# Patient Record
Sex: MALE | Race: WHITE | NOT HISPANIC OR LATINO | Employment: UNEMPLOYED | URBAN - METROPOLITAN AREA
[De-identification: names, ages, dates, MRNs, and addresses within clinical notes are randomized per-mention and may not be internally consistent; named-entity substitution may affect disease eponyms.]

---

## 2018-02-12 PROBLEM — R05.9 COUGH: Status: ACTIVE | Noted: 2018-02-12

## 2018-02-12 PROBLEM — R63.4 WEIGHT LOSS, UNINTENTIONAL: Status: ACTIVE | Noted: 2018-02-12

## 2018-02-12 PROBLEM — H66.91 ACUTE EAR INFECTION, RIGHT: Status: ACTIVE | Noted: 2018-02-12

## 2018-02-12 PROBLEM — R35.0 FREQUENT URINATION: Status: ACTIVE | Noted: 2018-02-12

## 2018-02-12 PROBLEM — R53.83 OTHER FATIGUE: Status: ACTIVE | Noted: 2018-02-12

## 2018-02-12 PROBLEM — E16.2 HYPOGLYCEMIA: Status: ACTIVE | Noted: 2018-02-12

## 2018-02-12 PROBLEM — R10.31 RIGHT LOWER QUADRANT ABDOMINAL PAIN: Status: ACTIVE | Noted: 2018-02-12

## 2018-02-12 PROBLEM — R30.0 DYSURIA: Status: ACTIVE | Noted: 2018-02-12

## 2018-02-12 PROBLEM — J02.9 SORE THROAT: Status: ACTIVE | Noted: 2018-02-12

## 2018-02-12 PROBLEM — B00.9 HSV-1 INFECTION: Status: ACTIVE | Noted: 2018-02-12

## 2018-02-26 ENCOUNTER — OFFICE VISIT (OUTPATIENT)
Dept: ENDOCRINOLOGY | Facility: MEDICAL CENTER | Age: 33
End: 2018-02-26
Payer: MEDICAID

## 2018-02-26 VITALS
OXYGEN SATURATION: 98 % | HEIGHT: 72 IN | HEART RATE: 54 BPM | SYSTOLIC BLOOD PRESSURE: 136 MMHG | BODY MASS INDEX: 24.11 KG/M2 | WEIGHT: 178 LBS | DIASTOLIC BLOOD PRESSURE: 78 MMHG

## 2018-02-26 DIAGNOSIS — R73.09 ELEVATED RANDOM BLOOD GLUCOSE LEVEL: ICD-10-CM

## 2018-02-26 PROBLEM — R73.9 ELEVATED RANDOM BLOOD GLUCOSE LEVEL: Status: ACTIVE | Noted: 2018-02-26

## 2018-02-26 LAB
HBA1C MFR BLD: 5.3 % (ref ?–5.8)
INT CON NEG: NORMAL
INT CON POS: NORMAL

## 2018-02-26 PROCEDURE — 99213 OFFICE O/P EST LOW 20 MIN: CPT | Performed by: PHYSICIAN ASSISTANT

## 2018-02-26 PROCEDURE — 83036 HEMOGLOBIN GLYCOSYLATED A1C: CPT | Performed by: PHYSICIAN ASSISTANT

## 2018-02-27 PROBLEM — R73.9 BLOOD GLUCOSE ELEVATED: Status: ACTIVE | Noted: 2018-02-27

## 2018-02-27 PROBLEM — R10.9 ABDOMINAL PAIN: Status: ACTIVE | Noted: 2018-02-27

## 2018-02-27 NOTE — PROGRESS NOTES
New Patient Consult Note  Referred by: RENO [place]  Reason for consult:  Elevated BG    HPI:  Jay Will is a 32 y.o. old patient who is seeing us today for diabetes care.  This is a pleasant patient with diabetes and I appreciate the opportunity to participate in the care of this patient.  This is a new patient with me today.    Labs of 2/26/18 HbA1c is 5.3  Labs of 2/12/18  c-peptide 5.53, GFR>60,     BG Diary:2/26/2018  In the AM: does not check    1. Elevated random blood glucose level  This is a new patient with me on 2/26/18  He is on:  No medication    He states he was worried about diabetes.        ROS:   Constitutional: No change in weight , No fatigue, No night sweats.  HEENT: No Headache.  Eyes:  No blurred vision, No visual changes.  Cardiac: No chest pain, No palpitations.  Resp: No shortness of breath, No cough,   Gastro: No nausea or vomiting, No diarrhea.  Neuro: Denies numbness or tinging in bilateral feet or hands, and no loss of sensation.  Endo: No heat or cold intolerance.  : No polyuria, No polydipsia, No chronic UTI's.  Lower extremities: No lower leg edema bilateral.  All other systems were reviewed and were negative.    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 No pertinent surgical history.    Allergies:  Patient has no known allergies.    Social History:  Social History     Social History   • Marital status: Single     Spouse name: N/A   • Number  "of children: N/A   • Years of education: N/A     Occupational History   • Not on file.     Social History Main Topics   • Smoking status: Former Smoker     Packs/day: 0.50     Types: Cigarettes   • Smokeless tobacco: Never Used   • Alcohol use No      Comment: Former   • Drug use: No   • Sexual activity: Not on file     Other Topics Concern   • Not on file     Social History Narrative   • No narrative on file       Family History:  Family History   Problem Relation Age of Onset   • Diabetes Brother        Medications:  No current outpatient prescriptions on file.      Physical Examination:   Vital signs: /78   Pulse (!) 54   Ht 1.829 m (6' 0.01\")   Wt 80.7 kg (178 lb)   SpO2 98%   BMI 24.14 kg/m²   General: No distress, cooperative, well dressed and well nourished.   Eyes: No scleral icterus or discharge, No hyposphagma  ENMT: Normal on external inspection of nose, lips, No nasal drainage   Neck: No abnormal masses on inspection  Resp: Normal effort, Bilateral clear to auscultation, No wheezing, No rales  CVS: Regular rate and rhythm, S1 S2 normal, No murmur. No gallop  Extremities: No edema bilateral extremities  Neuro: Alert and oriented  Skin: No rash, No Ulcers  Psych: Normal mood and affect      Assessment and Plan:    1. Elevated random blood glucose level  I discussed with him what diabetes is.  I discussed that he is not pre-diabetic with a HbA1c of 5.3.  I discussed with him food at length to have protein with all carbs and to exercise    Does not need to F/u    Blood glucose log: Check BG in the morning when wake up, before lunch or dinner and before bed.  So three times a day.  Always bring BG diary to the next office visit.      Thank you kindly for allowing me to participate in the diabetes care plan for this patient.    Michael Negrete PA-C, BC-ADM  Board Certified - [employer] [facility]  02/26/18    CC:   RENO [place]    "

## 2019-02-22 PROBLEM — R09.89 TENDER LYMPH NODE: Status: ACTIVE | Noted: 2019-02-22

## 2019-02-22 PROBLEM — E16.2 HYPOGLYCEMIA: Status: RESOLVED | Noted: 2018-02-12 | Resolved: 2019-02-22

## 2019-02-22 PROBLEM — R63.4 WEIGHT LOSS, UNINTENTIONAL: Status: RESOLVED | Noted: 2018-02-12 | Resolved: 2019-02-22

## 2019-02-22 PROBLEM — R35.0 FREQUENT URINATION: Status: RESOLVED | Noted: 2018-02-12 | Resolved: 2019-02-22

## 2019-02-22 PROBLEM — B00.9 HSV-1 INFECTION: Status: RESOLVED | Noted: 2018-02-12 | Resolved: 2019-02-22

## 2019-02-22 PROBLEM — R30.0 DYSURIA: Status: RESOLVED | Noted: 2018-02-12 | Resolved: 2019-02-22

## 2019-02-22 PROBLEM — R05.9 COUGH: Status: RESOLVED | Noted: 2018-02-12 | Resolved: 2019-02-22

## 2019-02-22 PROBLEM — H66.91 ACUTE EAR INFECTION, RIGHT: Status: RESOLVED | Noted: 2018-02-12 | Resolved: 2019-02-22

## 2019-02-22 PROBLEM — R73.09 ELEVATED RANDOM BLOOD GLUCOSE LEVEL: Status: RESOLVED | Noted: 2018-02-26 | Resolved: 2019-02-22

## 2019-02-22 PROBLEM — R10.31 RIGHT LOWER QUADRANT ABDOMINAL PAIN: Status: RESOLVED | Noted: 2018-02-12 | Resolved: 2019-02-22

## 2019-02-22 PROBLEM — R50.9 FEVER AND CHILLS: Status: ACTIVE | Noted: 2019-02-22

## 2019-02-22 PROBLEM — R73.9 BLOOD GLUCOSE ELEVATED: Status: RESOLVED | Noted: 2018-02-27 | Resolved: 2019-02-22

## 2019-02-22 PROBLEM — J35.1 ENLARGED TONSILS: Status: ACTIVE | Noted: 2019-02-22

## 2019-02-22 PROBLEM — R73.9 ELEVATED RANDOM BLOOD GLUCOSE LEVEL: Status: RESOLVED | Noted: 2018-02-26 | Resolved: 2019-02-22

## 2019-02-22 PROBLEM — R10.9 ABDOMINAL PAIN: Status: RESOLVED | Noted: 2018-02-27 | Resolved: 2019-02-22

## 2019-02-22 PROBLEM — H93.8X3 EAR CONGESTION, BILATERAL: Status: ACTIVE | Noted: 2019-02-22

## 2019-12-04 PROBLEM — Z23 NEED FOR INFLUENZA VACCINATION: Status: ACTIVE | Noted: 2019-12-04

## 2019-12-04 PROBLEM — Z86.19 H/O HERPES LABIALIS: Status: ACTIVE | Noted: 2019-12-04

## 2019-12-04 PROBLEM — M79.10 MUSCLE SORENESS: Status: ACTIVE | Noted: 2019-12-04

## 2019-12-04 PROBLEM — K13.79 MOUTH SORE: Status: ACTIVE | Noted: 2019-12-04

## 2019-12-04 PROBLEM — R04.0 FREQUENT NOSEBLEEDS: Status: ACTIVE | Noted: 2019-12-04

## 2020-02-05 PROBLEM — R10.10 PAIN OF UPPER ABDOMEN: Status: ACTIVE | Noted: 2018-02-27

## 2020-02-05 PROBLEM — R04.2 BLOOD IN SPUTUM: Status: ACTIVE | Noted: 2020-02-05

## 2020-03-10 PROBLEM — R04.0 FREQUENT NOSEBLEEDS: Status: RESOLVED | Noted: 2019-12-04 | Resolved: 2020-03-10

## 2020-03-10 PROBLEM — Z86.19 H/O HERPES LABIALIS: Status: RESOLVED | Noted: 2019-12-04 | Resolved: 2020-03-10

## 2020-03-10 PROBLEM — H93.8X3 EAR CONGESTION, BILATERAL: Status: RESOLVED | Noted: 2019-02-22 | Resolved: 2020-03-10

## 2020-03-10 PROBLEM — J35.1 ENLARGED TONSILS: Status: RESOLVED | Noted: 2019-02-22 | Resolved: 2020-03-10

## 2020-03-10 PROBLEM — R53.83 OTHER FATIGUE: Status: RESOLVED | Noted: 2018-02-12 | Resolved: 2020-03-10

## 2020-03-10 PROBLEM — J02.9 SORE THROAT: Status: RESOLVED | Noted: 2018-02-12 | Resolved: 2020-03-10

## 2020-03-10 PROBLEM — J30.2 SEASONAL ALLERGIES: Status: ACTIVE | Noted: 2020-03-10

## 2020-03-10 PROBLEM — M79.10 MUSCLE SORENESS: Status: RESOLVED | Noted: 2019-12-04 | Resolved: 2020-03-10

## 2020-03-10 PROBLEM — R10.10 PAIN OF UPPER ABDOMEN: Status: RESOLVED | Noted: 2018-02-27 | Resolved: 2020-03-10

## 2020-03-10 PROBLEM — R50.9 FEVER AND CHILLS: Status: RESOLVED | Noted: 2019-02-22 | Resolved: 2020-03-10

## 2020-03-10 PROBLEM — R04.2 BLOOD IN SPUTUM: Status: RESOLVED | Noted: 2020-02-05 | Resolved: 2020-03-10

## 2020-03-10 PROBLEM — K13.79 MOUTH SORE: Status: RESOLVED | Noted: 2019-12-04 | Resolved: 2020-03-10

## 2020-03-10 PROBLEM — R09.89 TENDER LYMPH NODE: Status: RESOLVED | Noted: 2019-02-22 | Resolved: 2020-03-10

## 2020-03-10 PROBLEM — Z23 NEED FOR INFLUENZA VACCINATION: Status: RESOLVED | Noted: 2019-12-04 | Resolved: 2020-03-10

## 2020-04-01 PROBLEM — R63.8: Status: RESOLVED | Noted: 2018-02-12 | Resolved: 2019-02-22

## 2020-10-26 PROBLEM — Z23 NEED FOR VACCINATION: Status: ACTIVE | Noted: 2020-10-26

## 2020-10-26 PROBLEM — M54.50 LOW BACK PAIN: Status: ACTIVE | Noted: 2020-10-26

## 2020-10-26 PROBLEM — D69.1 ABNORMAL PLATELET AGGREGATION (HCC): Status: ACTIVE | Noted: 2020-10-26

## 2021-01-21 PROBLEM — R03.0 ELEVATED BP WITHOUT DIAGNOSIS OF HYPERTENSION: Status: ACTIVE | Noted: 2021-01-21

## 2021-01-21 PROBLEM — R35.1 FREQUENT URINATION AT NIGHT: Status: ACTIVE | Noted: 2021-01-21

## 2021-01-21 PROBLEM — R45.89 ANXIETY ABOUT HEALTH: Status: ACTIVE | Noted: 2021-01-21

## 2021-01-21 PROBLEM — F41.8 ANXIETY ABOUT HEALTH: Status: ACTIVE | Noted: 2021-01-21

## 2021-02-04 PROBLEM — K76.0 FATTY LIVER: Status: ACTIVE | Noted: 2021-02-04

## 2022-04-21 PROBLEM — N41.9 PROSTATITIS: Status: ACTIVE | Noted: 2021-02-23

## 2022-07-13 PROBLEM — I10 PRIMARY HYPERTENSION: Status: ACTIVE | Noted: 2022-07-13

## 2023-02-23 PROBLEM — L03.311 CELLULITIS OF ABDOMINAL WALL: Status: ACTIVE | Noted: 2023-02-23
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
## 2023-03-03 PROBLEM — M79.661 PAIN IN RIGHT SHIN: Status: ACTIVE | Noted: 2023-03-03

## 2024-01-05 ENCOUNTER — HOSPITAL ENCOUNTER (OUTPATIENT)
Dept: LAB | Facility: MEDICAL CENTER | Age: 39
End: 2024-01-05
Payer: MEDICAID

## 2024-01-05 PROCEDURE — 36415 COLL VENOUS BLD VENIPUNCTURE: CPT

## 2024-01-05 PROCEDURE — 91065 BREATH HYDROGEN/METHANE TEST: CPT

## 2024-01-17 LAB — TEST NAME 95000: NORMAL

## 2025-01-14 ENCOUNTER — HOSPITAL ENCOUNTER (OUTPATIENT)
Dept: LAB | Facility: MEDICAL CENTER | Age: 40
End: 2025-01-14
Payer: MEDICAID

## 2025-01-14 PROCEDURE — 36415 COLL VENOUS BLD VENIPUNCTURE: CPT

## 2025-01-14 PROCEDURE — 91065 BREATH HYDROGEN/METHANE TEST: CPT | Mod: 91

## 2025-01-24 LAB — TEST NAME 95000: NORMAL
